# Patient Record
Sex: FEMALE | Employment: OTHER
[De-identification: names, ages, dates, MRNs, and addresses within clinical notes are randomized per-mention and may not be internally consistent; named-entity substitution may affect disease eponyms.]

---

## 2017-03-03 ENCOUNTER — FOLLOW UP (OUTPATIENT)
Age: 82
End: 2017-03-03

## 2017-03-03 NOTE — PATIENT DISCUSSION
I have explained to the patient that with chronic edema and the amount of Avastin and Eylea injections that have been done so far. We just aren't able to decrease the fluid. I have recommended that we hold off on any more treatment as this time. Her visual aquity is 20/400 in her right eye and has been for quite some time now.

## 2017-03-06 ASSESSMENT — VISUAL ACUITY
OD_SC: 20/400
OS_SC: 20/25-2

## 2017-03-06 ASSESSMENT — TONOMETRY
OD_IOP_MMHG: 16
OS_IOP_MMHG: 15

## 2017-08-25 ENCOUNTER — FOLLOW UP (OUTPATIENT)
Age: 82
End: 2017-08-25

## 2017-08-28 ASSESSMENT — VISUAL ACUITY
OS_PH: 20/25-1
OS_SC: 20/40

## 2017-08-28 ASSESSMENT — TONOMETRY
OS_IOP_MMHG: 13
OD_IOP_MMHG: 14

## 2018-04-20 ENCOUNTER — FOLLOW UP (OUTPATIENT)
Age: 83
End: 2018-04-20

## 2018-04-23 ASSESSMENT — VISUAL ACUITY: OS_CC: 20/40+2

## 2018-04-23 ASSESSMENT — TONOMETRY
OS_IOP_MMHG: 12
OD_IOP_MMHG: 16

## 2018-08-24 ENCOUNTER — FOLLOW UP (OUTPATIENT)
Age: 83
End: 2018-08-24

## 2018-08-27 ASSESSMENT — TONOMETRY
OS_IOP_MMHG: 15
OD_IOP_MMHG: 17

## 2018-08-27 ASSESSMENT — VISUAL ACUITY: OS_CC: 20/40+2

## 2019-01-04 ENCOUNTER — FOLLOW UP (OUTPATIENT)
Age: 84
End: 2019-01-04

## 2019-01-08 ASSESSMENT — VISUAL ACUITY
OS_CC: 20/25+2
OD_CC: CF 2FT

## 2019-01-08 ASSESSMENT — TONOMETRY
OD_IOP_MMHG: 16
OS_IOP_MMHG: 16

## 2019-07-12 ENCOUNTER — FOLLOW UP (OUTPATIENT)
Age: 84
End: 2019-07-12

## 2019-07-18 ASSESSMENT — VISUAL ACUITY
OS_CC: 20/25
OD_CC: CF 2FT

## 2019-07-18 ASSESSMENT — TONOMETRY
OS_IOP_MMHG: 15
OD_IOP_MMHG: 17

## 2020-01-31 ENCOUNTER — FOLLOW UP (OUTPATIENT)
Age: 85
End: 2020-01-31

## 2020-02-03 ASSESSMENT — TONOMETRY
OD_IOP_MMHG: 11
OS_IOP_MMHG: 12

## 2020-02-03 ASSESSMENT — VISUAL ACUITY
OS_PH: 20/25-2
OD_SC: CF 2FT
OS_SC: 20/40

## 2020-06-26 ENCOUNTER — FOLLOW UP (OUTPATIENT)
Age: 85
End: 2020-06-26

## 2020-06-29 ASSESSMENT — VISUAL ACUITY
OD_SC: CF 2FT
OS_SC: 20/40+2

## 2020-06-29 ASSESSMENT — TONOMETRY
OD_IOP_MMHG: 17
OS_IOP_MMHG: 14

## 2020-12-07 NOTE — PATIENT DISCUSSION
CATARACT, OU - EQUALLY VISUALLY SIGNIFICANT .  SCHEDULE SX OD THEN LATER IN OS IF VISUAL SYMPTOMS PERSIST. ( PT RIGHT EYE IS DOM EYE)

## 2020-12-07 NOTE — PATIENT DISCUSSION
Surgery Counseling: I have discussed the option of glasses versus cataract surgery versus following. It was explained that when the patients vision no longer meets their visual needs and a glasses prescription does not improve visual symptoms, the option of cataract surgery is a reasonable next step. It was explained that there is no guarantee that removing the cataract will improve their visual symptoms, however; it is believed that the cataract is contributing to the patient's visual impairment and surgery may improve both the visual and functional status of the patient. The risks, benefits and alternatives of surgery were discussed with the patient. After this discussion, the patient desires to proceed with cataract surgery with implantation of an intraocular lens to improve vision for glare when driving at night.

## 2020-12-07 NOTE — PATIENT DISCUSSION
PANOPTIX IOL DISCUSSED WITH THE PATIENT. THE PATIENT UNDERSTANDS THAT THE COMPROMISES AND VISUAL SYMPTOMS FROM PATIENT TO PATIENT IS VARIABLE. IT IS IMPOSSIBLE TO PREDICT THE OUTCOME OF THE LENS BEING OFFERED. PANOPTIX TRIFOCAL IOL IS DESIGNED TO DECREASE DEPENDENCY ON GLASSES FOR NEAR, INTERMEDIATE AND DISTANCE. COMPROMISES INCLUDE DECREASED QUALITY OF VISION, DEPTH OF FOCUS AND CONTRAST SENSITIVITY. VISUAL DISTURBANCES INCLUDE STARBURSTS, RINGS AND HALOS USUALLY NOTICED AROUND POINT SOURCES OF LIGHT AT NIGHT. STOCK PRINTS PT MAY NEED READERS.

## 2020-12-07 NOTE — PATIENT DISCUSSION
LIZ, OU- DISCUSSED PT LOSING HER NEAR WITH ANY LENS AND THAT VIVITY WILL GIVE HER DISTANCE AND INTERMEDIATE VISION FOR HER TO PLAY TENNIS AND OTHER OUTDOOR ACTIVITIES SHE DESIRES.

## 2020-12-09 NOTE — PATIENT DISCUSSION
Continue: prednisol ace-gatiflox-bromfen (prednisol ace-gatiflox-bromfen): drops,suspension: 1-0.5-0.075% 1 drop three times a day as directed into affected eye 12-

## 2020-12-14 NOTE — PATIENT DISCUSSION
RETINA IS ATTACHED OU: PVD OU; BENIGN FAMILIAL PERIPHERAL DRUSEN OU; NO HOLES OR TEARS SEEN ON DILATED EXAM TODAY.  RETINAL DETACHMENT SIGNS AND SYMPTOMS REVIEWED

## 2021-01-04 NOTE — PATIENT DISCUSSION
S/P PCIOL, OD - PT IS NOTICING A VISUAL DISTURBANCE IN THE RIGHT EYE. STABLE FUNDUS EXAMINATION TODAY. OK TO PROCEED WITH FELLOW EYE SURGERY AS SCHEDULED.

## 2021-01-04 NOTE — PATIENT DISCUSSION
PVD OD - MOST LIKELY CAUSE OF VISUAL DISTURBANCE. RETINA APPEARS FLAT. NO HOLES OR TEARS. RETINAL DETACHMENT PRECAUTIONS REVIEWED. PT ENCOURAGED TO CALL WITH ANY CHANGES.

## 2021-01-04 NOTE — PATIENT DISCUSSION
CATARACT, OS - OFFERED TO HAVE HER CLEARED BY DR. Tony Nyhan PRIOR TO CATARACT SURGERY 2' VISUAL DISCOMFORT IN THE RIGHT EYE. 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 VISUALLY SIGNIFICANT.  SCHEDULE SX.

## 2021-01-28 NOTE — PATIENT DISCUSSION
PVD, OU - RETINA IS ATTACHED OU. NO HOLES OR TEARS SEEN ON DILATED EXAM TODAY. RETINAL DETACHMENT SIGNS AND SYMPTOMS REVIEWED.  [address]

## 2021-05-07 NOTE — PATIENT DISCUSSION
Ptosis Counseling:  I have examined the patient and reviewed the photos and visual fields. The upper eyelid margin in ptosis obstructs the visual axis causing  impairment of the peripheral visual field. I have discussed with the patient the option of levator advancement surgery to lift the upper eyelid position and improve the functional visual field. We have discussed the risks and benefits of the surgery at length as well as the location of the incision and the recovery process. The patient understands the surgery, has had all questions answered   and desires to proceed with the surgery as explained.

## 2021-05-07 NOTE — PATIENT DISCUSSION
MILD BROW PTOSIS; OU: RECOMMEND COSMETIC PRETRICHIAL BROW LIFT, OU. DISCUSSED RISKS AND BENEFITS OF PROCEDURE WITH PATIENT TODAY. PATIENT UNDERSTANDS THIS IS A COSMETIC PROCEDURE. SHE WILL CONSIDER HER OPTIONS AND SCHEDULE AT HER CONVENIENCE.

## 2021-05-28 NOTE — PATIENT DISCUSSION
Problem: Patient Care Overview  Goal: Plan of Care/Patient Progress Review  OT:  Per PT, patient has declined therapy this afternoon due to fatigue.       Stefano Zendejas M.D.

## 2021-11-01 NOTE — PATIENT DISCUSSION
The patient presented with facial rhytids underneath lower lip and marionette lines, she had Juvederm Ultra Plus dermal filler left over from last office visit and proceeded to use the rest today. Procedure went well, given post operative instructions. Follow up prn.

## 2022-02-25 ENCOUNTER — FOLLOW UP (OUTPATIENT)
Dept: URBAN - METROPOLITAN AREA CLINIC 20 | Facility: CLINIC | Age: 87
End: 2022-02-25

## 2022-02-25 DIAGNOSIS — H35.3211: ICD-10-CM

## 2022-02-25 DIAGNOSIS — H35.3123: ICD-10-CM

## 2022-02-25 PROCEDURE — 92014 COMPRE OPH EXAM EST PT 1/>: CPT

## 2022-02-25 RX ORDER — MINERAL OIL 2; 2 MG/.4ML; MG/.4ML: 1 EMULSION OPHTHALMIC

## 2022-02-25 ASSESSMENT — KERATOMETRY
OS_AXISANGLE_DEGREES: 73
OD_K2POWER_DIOPTERS: 47.75
OS_K1POWER_DIOPTERS: 46.25
OD_AXISANGLE2_DEGREES: 9
OS_K2POWER_DIOPTERS: 47.50
OS_AXISANGLE2_DEGREES: 163
OD_AXISANGLE_DEGREES: 99
OD_K1POWER_DIOPTERS: 46.75

## 2022-02-25 ASSESSMENT — TONOMETRY
OD_IOP_MMHG: 14
OS_IOP_MMHG: 12

## 2022-02-25 ASSESSMENT — VISUAL ACUITY
OU_CC: J5
OS_CC: 20/30-2

## 2022-02-25 NOTE — PATIENT DISCUSSION
* 7.23.21: REVIEWED DR. Cobb Ser NOTES. HE AGREES WITH DR. GERMAIN REGARDING OBSERVATION ONLY NEEDED AT THIS TIME FOR THE LEFT EYE. TREATMENTS HAVE STOPPED IN THE RIGHT EYE. * 11/16/21: STILL NO TREATMENTS IN OD DUE TO POOR PROGNOSIS OF RESTORED VA.

## 2022-02-25 NOTE — PATIENT DISCUSSION
* 7.23.21: REVIEWED DR. Freddy Fraga NOTES. HE AGREES WITH DR. GERMAIN REGARDING OBSERVATION ONLY NEEDED AT THIS TIME FOR THE LEFT EYE. TREATMENTS HAVE STOPPED IN THE RIGHT EYE. * 11/16/21: OBSERVE OS FOR NOW - DR VU.

## 2022-02-25 NOTE — PATIENT DISCUSSION
Continue: * WARM COMPRESSES LID SCRUBS HYPROCHLOR SPRAY * RESTASIS- 1 DROP ONCE DAILY IN BOTH EYES- loves the individual vials instead of bottles. * RETAINE MGD 3-4 TIMES A DAY . Other Instructions: IF SKIN GETS PUFFY AGAIN, * CAN USE OLOPATADINE- 1 DROP TO EYES ONCE A DAY AND HYDROCORTISONE * APPLY TO SKIN AROUND THE EYES ONCE A DAY FOR A FEW DAYS.

## 2022-02-25 NOTE — PATIENT DISCUSSION
[facility] monitoring,LUIS vitamins, no smoking, green leafy vegetables discussed.

## 2022-04-04 NOTE — PATIENT DISCUSSION
Discussed AREDS supplements, BP Control, and dark leafy green vegetables.
Dry ARMD is responsible for some decrease in vision.
Membrane is not visually significant in her left eye.
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
No clinical evidence of choroidal neovascularization seen on exam or OCT testing.
No treatment likely to help at this time.  Her vision to is 20/200 in her right eye.
Patient understands condition, prognosis and need for follow up care.
Recommended observation.
[phone]

## 2022-08-24 ENCOUNTER — DIAGNOSTICS ONLY (OUTPATIENT)
Dept: URBAN - METROPOLITAN AREA CLINIC 20 | Facility: CLINIC | Age: 87
End: 2022-08-24

## 2022-08-24 DIAGNOSIS — H35.3123: ICD-10-CM

## 2022-08-24 DIAGNOSIS — H35.3211: ICD-10-CM

## 2022-08-24 PROCEDURE — 99211T TECH SERVICE

## 2022-08-24 PROCEDURE — 92134 CPTRZ OPH DX IMG PST SGM RTA: CPT

## 2022-08-24 ASSESSMENT — KERATOMETRY
OD_AXISANGLE2_DEGREES: 9
OD_AXISANGLE_DEGREES: 99
OD_K1POWER_DIOPTERS: 46.75
OD_K2POWER_DIOPTERS: 47.75
OS_AXISANGLE2_DEGREES: 163
OS_AXISANGLE_DEGREES: 73
OS_K1POWER_DIOPTERS: 46.25
OS_K2POWER_DIOPTERS: 47.50

## 2022-08-24 NOTE — PATIENT DISCUSSION
* 7.23.21: REVIEWED DR. Triston Brewer NOTES. HE AGREES WITH DR. GERMAIN REGARDING OBSERVATION ONLY NEEDED AT THIS TIME FOR THE LEFT EYE. TREATMENTS HAVE STOPPED IN THE RIGHT EYE. * 11/16/21: OBSERVE OS FOR NOW - DR VU.

## 2022-08-24 NOTE — PATIENT DISCUSSION
* 7.23.21: REVIEWED DR. Kay Haley NOTES. HE AGREES WITH DR. GERMAIN REGARDING OBSERVATION ONLY NEEDED AT THIS TIME FOR THE LEFT EYE. TREATMENTS HAVE STOPPED IN THE RIGHT EYE. * 11/16/21: STILL NO TREATMENTS IN OD DUE TO POOR PROGNOSIS OF RESTORED VA.

## 2022-09-13 ENCOUNTER — FOLLOW UP (OUTPATIENT)
Dept: URBAN - METROPOLITAN AREA CLINIC 20 | Facility: CLINIC | Age: 87
End: 2022-09-13

## 2022-09-13 DIAGNOSIS — H35.3211: ICD-10-CM

## 2022-09-13 DIAGNOSIS — H02.88A: ICD-10-CM

## 2022-09-13 DIAGNOSIS — H35.3123: ICD-10-CM

## 2022-09-13 DIAGNOSIS — H10.13: ICD-10-CM

## 2022-09-13 DIAGNOSIS — H02.88B: ICD-10-CM

## 2022-09-13 DIAGNOSIS — H52.4: ICD-10-CM

## 2022-09-13 PROCEDURE — 92250 FUNDUS PHOTOGRAPHY W/I&R: CPT

## 2022-09-13 PROCEDURE — 92014 COMPRE OPH EXAM EST PT 1/>: CPT

## 2022-09-13 ASSESSMENT — VISUAL ACUITY
OU_CC: J1+
OS_CC: 20/30+2
OU_CC: 20/30+2

## 2022-09-13 ASSESSMENT — TONOMETRY
OS_IOP_MMHG: 11
OD_IOP_MMHG: 13

## 2022-09-13 NOTE — PATIENT DISCUSSION
* 7.23.21: REVIEWED DR. Alex العلي NOTES. HE AGREES WITH DR. GERMAIN REGARDING OBSERVATION ONLY NEEDED AT THIS TIME FOR THE LEFT EYE. TREATMENTS HAVE STOPPED IN THE RIGHT EYE. * 11/16/21: STILL NO TREATMENTS IN OD DUE TO POOR PROGNOSIS OF RESTORED VA.

## 2023-05-03 ENCOUNTER — ESTABLISHED PATIENT (OUTPATIENT)
Dept: URBAN - METROPOLITAN AREA CLINIC 20 | Facility: CLINIC | Age: 88
End: 2023-05-03

## 2023-05-03 DIAGNOSIS — H35.3123: ICD-10-CM

## 2023-05-03 DIAGNOSIS — H52.4: ICD-10-CM

## 2023-05-03 PROCEDURE — 92012 INTRM OPH EXAM EST PATIENT: CPT

## 2023-05-03 PROCEDURE — 92015 DETERMINE REFRACTIVE STATE: CPT

## 2023-05-03 RX ORDER — NEOMYCIN SULFATE, POLYMYXIN B SULFATE AND DEXAMETHASONE 3.5; 10000; 1 MG/G; [USP'U]/G; MG/G: OINTMENT OPHTHALMIC TWICE A DAY

## 2023-05-03 ASSESSMENT — KERATOMETRY
OS_AXISANGLE2_DEGREES: 163
OS_K2POWER_DIOPTERS: 47.50
OS_K1POWER_DIOPTERS: 45.75
OD_AXISANGLE_DEGREES: 96
OD_AXISANGLE2_DEGREES: 6
OS_AXISANGLE_DEGREES: 73
OD_K2POWER_DIOPTERS: 45.00
OD_K1POWER_DIOPTERS: 44.00

## 2023-05-03 ASSESSMENT — TONOMETRY
OS_IOP_MMHG: 10
OD_IOP_MMHG: 13

## 2023-05-03 ASSESSMENT — VISUAL ACUITY
OS_CC: 20/25-2
OU_CC: 20/30-1

## 2023-09-06 ENCOUNTER — ESTABLISHED PATIENT (OUTPATIENT)
Dept: URBAN - METROPOLITAN AREA CLINIC 20 | Facility: CLINIC | Age: 88
End: 2023-09-06

## 2023-09-06 DIAGNOSIS — H35.3123: ICD-10-CM

## 2023-09-06 PROCEDURE — 92014 COMPRE OPH EXAM EST PT 1/>: CPT

## 2023-09-06 ASSESSMENT — KERATOMETRY
OD_K1POWER_DIOPTERS: 44.00
OD_K2POWER_DIOPTERS: 45.00
OS_AXISANGLE2_DEGREES: 163
OD_AXISANGLE_DEGREES: 96
OD_AXISANGLE2_DEGREES: 6
OS_K2POWER_DIOPTERS: 47.50
OS_AXISANGLE_DEGREES: 73
OS_K1POWER_DIOPTERS: 45.75

## 2023-09-06 ASSESSMENT — VISUAL ACUITY
OS_CC: 20/25-2
OD_CC: CF 3FT

## 2023-09-06 ASSESSMENT — TONOMETRY
OS_IOP_MMHG: 12
OD_IOP_MMHG: 15

## 2024-02-14 ENCOUNTER — ESTABLISHED PATIENT (OUTPATIENT)
Dept: URBAN - METROPOLITAN AREA CLINIC 20 | Facility: CLINIC | Age: 89
End: 2024-02-14

## 2024-02-14 DIAGNOSIS — H35.3212: ICD-10-CM

## 2024-02-14 DIAGNOSIS — H35.3123: ICD-10-CM

## 2024-02-14 PROCEDURE — 92134 CPTRZ OPH DX IMG PST SGM RTA: CPT

## 2024-02-14 PROCEDURE — 99213 OFFICE O/P EST LOW 20 MIN: CPT

## 2024-02-14 ASSESSMENT — VISUAL ACUITY
OS_CC: 20/30
OU_CC: 20/30-1

## 2024-02-14 ASSESSMENT — TONOMETRY
OS_IOP_MMHG: 10
OD_IOP_MMHG: 10

## 2024-04-24 ENCOUNTER — PREPPED CHART (OUTPATIENT)
Dept: URBAN - METROPOLITAN AREA CLINIC 18 | Facility: CLINIC | Age: 89
End: 2024-04-24

## 2025-02-25 ENCOUNTER — COMPREHENSIVE EXAM (OUTPATIENT)
Age: 89
End: 2025-02-25

## 2025-02-25 DIAGNOSIS — H02.88A: ICD-10-CM

## 2025-02-25 DIAGNOSIS — H35.3212: ICD-10-CM

## 2025-02-25 DIAGNOSIS — H52.4: ICD-10-CM

## 2025-02-25 DIAGNOSIS — H16.223: ICD-10-CM

## 2025-02-25 DIAGNOSIS — H02.88B: ICD-10-CM

## 2025-02-25 DIAGNOSIS — H35.3123: ICD-10-CM

## 2025-02-25 PROCEDURE — 92014 COMPRE OPH EXAM EST PT 1/>: CPT

## 2025-02-25 PROCEDURE — 92015 DETERMINE REFRACTIVE STATE: CPT

## 2025-08-26 ENCOUNTER — FOLLOW UP (OUTPATIENT)
Age: 89
End: 2025-08-26

## 2025-08-26 DIAGNOSIS — Z96.1: ICD-10-CM

## 2025-08-26 DIAGNOSIS — H35.3123: ICD-10-CM

## 2025-08-26 DIAGNOSIS — H16.223: ICD-10-CM

## 2025-08-26 DIAGNOSIS — H43.393: ICD-10-CM

## 2025-08-26 DIAGNOSIS — H35.3212: ICD-10-CM

## 2025-08-26 PROCEDURE — 99214 OFFICE O/P EST MOD 30 MIN: CPT
